# Patient Record
Sex: FEMALE | Race: BLACK OR AFRICAN AMERICAN | NOT HISPANIC OR LATINO | URBAN - METROPOLITAN AREA
[De-identification: names, ages, dates, MRNs, and addresses within clinical notes are randomized per-mention and may not be internally consistent; named-entity substitution may affect disease eponyms.]

---

## 2019-04-25 ENCOUNTER — OFFICE VISIT (OUTPATIENT)
Dept: INTERNAL MEDICINE | Facility: CLINIC | Age: 24
End: 2019-04-25
Payer: COMMERCIAL

## 2019-04-25 VITALS
DIASTOLIC BLOOD PRESSURE: 65 MMHG | WEIGHT: 134.3 LBS | HEART RATE: 72 BPM | SYSTOLIC BLOOD PRESSURE: 99 MMHG | RESPIRATION RATE: 20 BRPM | OXYGEN SATURATION: 98 %

## 2019-04-25 DIAGNOSIS — Z00.00 ROUTINE HISTORY AND PHYSICAL EXAMINATION OF ADULT: Primary | ICD-10-CM

## 2019-04-25 DIAGNOSIS — Z23 NEED FOR VACCINATION: ICD-10-CM

## 2019-04-25 ASSESSMENT — PAIN SCALES - GENERAL: PAINLEVEL: NO PAIN (0)

## 2019-04-25 NOTE — NURSING NOTE
TD shot given without problems, patient tolerated procedure well.Dasha Farias LPN 9:31 AM on 4/25/2019

## 2019-04-25 NOTE — PROGRESS NOTES
CC:  Routine physical and establish care    HPI:  Healthy 23 year old woman, here for the above    PMHx: previously had painful periods, took OCP's (did not tolerate) then was on depo provera, now off, so far periods not painful  PSHx: no surgeries  FHx:  Mother hypertension, but stress related only.  Has fibroids and heavy periods  Medications:  None  Allergies:  None  Non smoker  Lives alone, works as  at hotel, night shift  Immunizations:  Will update tetanus, she will investigate whether or not has had HPV  Pap up to date    ROS: minor low back pain.  Exercises cardio, stretching, plans yoga and resuming hip hop and jazz dancing.  She did this competitively while in high school.  Previous bacterial vaginosis resolved with metronidazole.  Had some clumps of discharge a month ago, now resolved.  Otherwise ROS 14 point negative.    BP 99/65 (BP Location: Right arm, Patient Position: Sitting, Cuff Size: Adult Regular)   Pulse 72   Resp 20   Wt 60.9 kg (134 lb 4.8 oz)   SpO2 98%   Physical Examination:    General:  Conversant, generally healthy appearing, no acute distress  Head: atraumatic  Eyes:  Pupils 2-3 mm, sclera white, EOM's full, conjunctiva moist, no lid lag    Ears:  TM's normal, EAC's patent, clear of cerumen  Nose:  Nasal passages clear, turbinates not swollen  Throat/Mouth:  No pharyngeal erythema, exudate, ulcers, oral mucosa and tongue moist, normal hard and soft palate  Neck:  Trachea midline, Full AROM, supple, thyroid smooth, symmetric, not enlarged, no nodules, no neck lymphadenopathy  Lungs:  Clear to auscultation throughout, no wheezes, rhonchi or rales. Normal respiratory effort and no intercostal retractions.  C/V:  Regular rate and rhythm, no murmurs, rubs or gallops.  No JVD, normal carotid upstroke and amplitude, no carotid bruits.  Abdomen:  Not distended.  Bowel sounds active.  No tenderness, no hepatosplenomegaly or masses.  No CVA tenderness or masses.  Lymph:  No  cervical lymph nodes.  Neuro: Alert and oriented, face symmetric. No tremor.  Gait steady.   M/S:   No joint deformities noted.  No joint swelling.  Skin:   Normal temperature., turgor and texture. No rashes, suspicious lesions,  jaundice or ulcers    Extremities:  No peripheral edema, no digital cyanosis  Psych:  Alert and oriented to person, place and time. Appropriate affect.  Not psychomotor slowed.  No signs of anxiety or agitation.    Marbella was seen today for establish care.    Diagnoses and all orders for this visit:    Routine history and physical examination of adult    Need for vaccination  -     TD PRESERVATIVE FREE, AGE >=7 YR    F/U one year and as needed.    Tracie Magdaleno M.D.  Internal Medicine  [facility]   pager [phone]

## 2019-04-25 NOTE — NURSING NOTE
Chief Complaint   Patient presents with     Establish Care     establish care/provider   Dasha Farias LPN 8:15 AM on 4/25/2019    Has been screened for HIV and was negative.Dasha Farias LPN 8:18 AM on 4/25/2019    Correction: Had pelvic/pap smear on 10/26/18.Dasha Farias LPN 8:21 AM on 4/25/2019

## 2020-02-05 ENCOUNTER — OFFICE VISIT (OUTPATIENT)
Dept: INTERNAL MEDICINE | Facility: CLINIC | Age: 25
End: 2020-02-05
Payer: COMMERCIAL

## 2020-02-05 VITALS
WEIGHT: 126.7 LBS | RESPIRATION RATE: 16 BRPM | HEART RATE: 72 BPM | DIASTOLIC BLOOD PRESSURE: 72 MMHG | BODY MASS INDEX: 24.87 KG/M2 | OXYGEN SATURATION: 100 % | SYSTOLIC BLOOD PRESSURE: 111 MMHG | HEIGHT: 60 IN | TEMPERATURE: 98.1 F

## 2020-02-05 DIAGNOSIS — Z11.3 ROUTINE SCREENING FOR STI (SEXUALLY TRANSMITTED INFECTION): ICD-10-CM

## 2020-02-05 DIAGNOSIS — Z00.00 HEALTH CARE MAINTENANCE: Primary | ICD-10-CM

## 2020-02-05 DIAGNOSIS — Z23 NEED FOR VACCINATION: ICD-10-CM

## 2020-02-05 DIAGNOSIS — K08.89 PAIN, DENTAL: ICD-10-CM

## 2020-02-05 DIAGNOSIS — Z12.4 SCREENING FOR MALIGNANT NEOPLASM OF CERVIX: ICD-10-CM

## 2020-02-05 LAB
B-HCG SERPL-ACNC: <1 IU/L (ref 0–5)
SPECIMEN SOURCE: NORMAL
WET PREP SPEC: NORMAL

## 2020-02-05 PROCEDURE — G0145 SCR C/V CYTO,THINLAYER,RESCR: HCPCS | Performed by: INTERNAL MEDICINE

## 2020-02-05 PROCEDURE — 87389 HIV-1 AG W/HIV-1&-2 AB AG IA: CPT | Performed by: INTERNAL MEDICINE

## 2020-02-05 PROCEDURE — 87491 CHLMYD TRACH DNA AMP PROBE: CPT | Performed by: INTERNAL MEDICINE

## 2020-02-05 PROCEDURE — 86803 HEPATITIS C AB TEST: CPT | Performed by: INTERNAL MEDICINE

## 2020-02-05 PROCEDURE — 87591 N.GONORRHOEAE DNA AMP PROB: CPT | Performed by: INTERNAL MEDICINE

## 2020-02-05 PROCEDURE — 86780 TREPONEMA PALLIDUM: CPT | Performed by: INTERNAL MEDICINE

## 2020-02-05 RX ORDER — NAPROXEN 500 MG/1
500 TABLET ORAL 2 TIMES DAILY PRN
Qty: 20 TABLET | Refills: 0 | Status: SHIPPED | OUTPATIENT
Start: 2020-02-05

## 2020-02-05 ASSESSMENT — MIFFLIN-ST. JEOR: SCORE: 1241.21

## 2020-02-05 ASSESSMENT — PAIN SCALES - GENERAL: PAINLEVEL: WORST PAIN (10)

## 2020-02-05 NOTE — PATIENT INSTRUCTIONS
[facility] Medication Refill Request Information:  * Please contact your pharmacy regarding ANY request for medication refills.  ** [facility] Prescription Fax = [phone]  * Please allow 3 business days for routine medication refills.  * Please allow 5 business days for controlled substance medication refills.     [facility] Test Result notification information:  *You will be notified with in 7-10 days of your appointment day regarding the results of your test.  If you are on MyChart you will be notified as soon as the provider has reviewed the results and signed off on them.    [facility]: [phone]

## 2020-02-05 NOTE — PROGRESS NOTES
"Internal Medicine P[facility] Progress Note      Marbella Herron MRN# [id number]   Age: 24 year old YOB: 1995      Date of Service (when I saw the patient): February 5, 2020    Assessment & Plan   Marbella Herron is a 24 year old female who presents for physical, STI testing and pap smear.    Health care maintenance  Patient due for STI screening and pap test. Also due for flu and HPV vaccines. Sexually active in monogamous relationship with male partner.  - Pap smear performed  - Testing for gonorrhea, chlamydia, and HIV ordered  - Wet prep ordered  - Flu and HPV vaccines administered  - Pregnancy test ordered    Dental pain  [place] teeth are coming in with pain she rates at 30/10. Has dental appointment scheduled 2/25 but is requesting referral to [facility] dental clinic.  - Naproxen 500mg PRN for pain  - Referral placed to dental clinic    Indra Earl, MS4  February 5, 2020    Primary Care Physician   Tracie Magdaleno    Chief Complaint   STI screening and pap smear    History is obtained from the patient    History of Present Illness   Marbella Herron is a 24 year old female who presents for STI testing and pap smear.    Here for STI testing and pap smear. Sexually active in monogamous relationship with one man. No discharge, dysuria, hematuria. Just wants regular checkup. Unsure if she got the HPV series. LMP finished on 1/20. Does not believe she is pregnant but is interested in pregnancy test anyway.    Not currently on any form of contraceptive and not interested in any at this time.    Has dental appointment 2/25/19 to discuss wisdom tooth extraction. Had planned to get them out last year but cancelled appointment after watching youtube videos of procedures. Last 4 days pain has progressed to \"30/10.\" Has been using oragel and benzocaine formulations for pain, but notes they wear off too quickly and she is worried about medication overuse. Has not tried any oral medications.    Past Medical History  "   I have reviewed this patient's medical history and updated it with pertinent information if needed.   Family History:  FHx:  Mother hypertension, but stress related only.  Has fibroids and heavy periods    Past Surgical History   I have reviewed this patient's surgical history and updated it with pertinent information if needed.    Current Outpatient Medications   Medication Sig Dispense Refill     naproxen (NAPROSYN) 500 MG tablet Take 1 tablet (500 mg) by mouth 2 times daily as needed for moderate pain 20 tablet 0       Allergies   No Known Allergies    Social History   Social History     Tobacco Use     Smoking status: Former Smoker     Smokeless tobacco: Never Used   Substance Use Topics     Alcohol use: None     Drug use: None       Social History     Social History Narrative    Lives alone, works as  at [employer], night shift       Family History   I have reviewed this patient's family history and updated it with pertinent information if needed.       Review of Systems   The 10 point Review of Systems is negative other than noted in the HPI or here.     Physical Exam   Vital Signs with Ranges  Temp:  [98.1  F (36.7  C)] 98.1  F (36.7  C)  Pulse:  [72] 72  Resp:  [16] 16  BP: (111)/(72) 111/72  SpO2:  [100 %] 100 %  126 lbs 11.2 oz    Physical Examination:    General:  Conversant, generally healthy appearing, no acute distress  Head: atraumatic  Eyes:  Pupils 2-3 mm, sclera white, EOM's full, conjunctiva moist, no periorbital swelling    Throat/Mouth:  Bilateral protrusion of wisdom teeth on mandible accompanied by swelling and erythema, worse on left than right.  Neck:  Trachea midline, Full AROM, supple, thyroid smooth, symmetric, not enlarged, no nodules, no neck lymphadenopathy  Lungs:  Clear to auscultation throughout, no wheezes, rhonchi or rales. Normal respiratory effort and no intercostal retractions.  C/V:  Regular rate and rhythm, no murmurs, rubs or gallops.  No JVD, no carotid bruits.  Radial and DP pulses 2+, equal and regular.  Lymph:  No cervical lymph nodes.  Neuro: Alert and oriented, face symmetric. Able to get on/off exam table without assistance.  Strength grossly intact. No tremor.  Gait steady.   M/S:   No joint deformities noted.  No joint swelling.  Skin:   Normal temperature., turgor and texture. No rashes, suspicious lesions, or jaundice on exposed skin surfaces.   Extremities:  No peripheral edema, no digital cyanosis  Psych:  Alert and oriented. Appropriate affect.  Not psychomotor slowed.  No signs of anxiety or agitation.  Pelvic: Vaginal mucosa moist and without blood or discharge. Nonfriable, nontender cervix with uniform, nonenlarged uterus and ovaries.     Data   No results found for this or any previous visit (from the past 24 hour(s)).    Patient staffed with attending Dr. Magdaleno.    Indra Earl, MS4  February 5, 2020    Teaching Physician  Disclosure:    I was present with the medical student, including during the pap/pelvic exam .  The student participated in the service and in the documentation of this note.  I have verified the history and personally performed the physical exam and medical decision making, and have verified the content of the note, which accurately reflects my assessment of the patient and the plan of care    Tracie Magdaleno M.D.  Internal Medicine   pager [phone]

## 2020-02-05 NOTE — NURSING NOTE
Chief Complaint   Patient presents with     Gyn Exam     Pt comes in for a pap smear and STD check.     Dental Problem     Pt complains of wisdom tooth pain.         DANA Fong on 2/5/2020 at 1:45 PM

## 2020-02-06 LAB
C TRACH DNA SPEC QL NAA+PROBE: NEGATIVE
HCV AB SERPL QL IA: NONREACTIVE
HIV 1+2 AB+HIV1 P24 AG SERPL QL IA: NONREACTIVE
N GONORRHOEA DNA SPEC QL NAA+PROBE: NEGATIVE
SPECIMEN SOURCE: NORMAL
SPECIMEN SOURCE: NORMAL
T PALLIDUM AB SER QL: NONREACTIVE

## 2020-02-11 LAB
COPATH REPORT: NORMAL
PAP: NORMAL

## 2020-03-11 ENCOUNTER — HEALTH MAINTENANCE LETTER (OUTPATIENT)
Age: 25
End: 2020-03-11

## 2020-09-01 ENCOUNTER — MYC MEDICAL ADVICE (OUTPATIENT)
Dept: INTERNAL MEDICINE | Facility: CLINIC | Age: 25
End: 2020-09-01

## 2020-09-01 NOTE — TELEPHONE ENCOUNTER
When the patient calls please schedule her first available IN PERSON with female provider, female resident, NP or NP downtown.  DANA Rodrigues at 12:49 PM on 9/1/2020.

## 2021-01-03 ENCOUNTER — HEALTH MAINTENANCE LETTER (OUTPATIENT)
Age: 26
End: 2021-01-03

## 2021-03-07 ENCOUNTER — HEALTH MAINTENANCE LETTER (OUTPATIENT)
Age: 26
End: 2021-03-07

## 2021-10-10 ENCOUNTER — HEALTH MAINTENANCE LETTER (OUTPATIENT)
Age: 26
End: 2021-10-10

## 2022-03-26 ENCOUNTER — HEALTH MAINTENANCE LETTER (OUTPATIENT)
Age: 27
End: 2022-03-26

## 2022-09-18 ENCOUNTER — HEALTH MAINTENANCE LETTER (OUTPATIENT)
Age: 27
End: 2022-09-18

## 2023-05-07 ENCOUNTER — HEALTH MAINTENANCE LETTER (OUTPATIENT)
Age: 28
End: 2023-05-07